# Patient Record
Sex: FEMALE | Race: OTHER | HISPANIC OR LATINO | URBAN - METROPOLITAN AREA
[De-identification: names, ages, dates, MRNs, and addresses within clinical notes are randomized per-mention and may not be internally consistent; named-entity substitution may affect disease eponyms.]

---

## 2019-12-07 ENCOUNTER — EMERGENCY (EMERGENCY)
Facility: HOSPITAL | Age: 24
LOS: 1 days | Discharge: HOME | End: 2019-12-07
Attending: EMERGENCY MEDICINE | Admitting: EMERGENCY MEDICINE
Payer: MEDICAID

## 2019-12-07 VITALS
RESPIRATION RATE: 18 BRPM | TEMPERATURE: 99 F | SYSTOLIC BLOOD PRESSURE: 112 MMHG | OXYGEN SATURATION: 99 % | HEART RATE: 66 BPM | DIASTOLIC BLOOD PRESSURE: 71 MMHG

## 2019-12-07 VITALS
HEIGHT: 68 IN | TEMPERATURE: 97 F | HEART RATE: 79 BPM | SYSTOLIC BLOOD PRESSURE: 119 MMHG | WEIGHT: 184.09 LBS | RESPIRATION RATE: 16 BRPM | DIASTOLIC BLOOD PRESSURE: 78 MMHG | OXYGEN SATURATION: 100 %

## 2019-12-07 PROCEDURE — 99284 EMERGENCY DEPT VISIT MOD MDM: CPT

## 2019-12-07 RX ORDER — GABAPENTIN 400 MG/1
1 CAPSULE ORAL
Qty: 90 | Refills: 0
Start: 2019-12-07 | End: 2020-01-05

## 2019-12-07 RX ORDER — GABAPENTIN 400 MG/1
600 CAPSULE ORAL ONCE
Refills: 0 | Status: COMPLETED | OUTPATIENT
Start: 2019-12-07 | End: 2019-12-07

## 2019-12-07 RX ORDER — OXYCODONE AND ACETAMINOPHEN 5; 325 MG/1; MG/1
1 TABLET ORAL ONCE
Refills: 0 | Status: DISCONTINUED | OUTPATIENT
Start: 2019-12-07 | End: 2019-12-07

## 2019-12-07 RX ADMIN — OXYCODONE AND ACETAMINOPHEN 1 TABLET(S): 5; 325 TABLET ORAL at 09:00

## 2019-12-07 RX ADMIN — OXYCODONE AND ACETAMINOPHEN 1 TABLET(S): 5; 325 TABLET ORAL at 09:35

## 2019-12-07 RX ADMIN — GABAPENTIN 600 MILLIGRAM(S): 400 CAPSULE ORAL at 09:34

## 2019-12-07 NOTE — ED PROVIDER NOTE - NSFOLLOWUPINSTRUCTIONS_ED_ALL_ED_FT
take medications as directed     stretch as discussed    follow up with orthopedics    return to ER if symptoms worsen or for any other concern

## 2019-12-07 NOTE — ED ADULT NURSE REASSESSMENT NOTE - NS ED NURSE REASSESS COMMENT FT1
Pt is A&Ox3 c.o right lower back pain that radiates down right leg since last night. Pt states pain is a 9/10 worse when sitting down. Pt pending assessment by MD. Pt noted OOB with steady gait. Warm packs applied to right lower back with minor relief stated by pt. Pt denies CP, SOB, numbness or tingling to bl upper and lower extremities, loss of bowel and urine.

## 2019-12-07 NOTE — ED PROVIDER NOTE - CARE PROVIDER_API CALL
Wayne Hernandez)  Orthopaedic Surgery Surgery  [address] [place] [address]  Phone: [phone]  Fax: [phone]  Follow Up Time: 1-3 Days

## 2019-12-07 NOTE — ED PROVIDER NOTE - CONSTITUTIONAL, MLM
normal... Awake, alert, oriented to person, place, time/situation. Patient appears mild-to-moderately uncomfortable and is complaining of shooting pain down the posterior back of the right leg.

## 2019-12-07 NOTE — ED ADULT TRIAGE NOTE - CHIEF COMPLAINT QUOTE
BIBA with c/o sudden onset right sides hip pain radiating down to RLE worsening since last night. No known injury, no deformity noted.

## 2019-12-07 NOTE — ED PROVIDER NOTE - CLINICAL SUMMARY MEDICAL DECISION MAKING FREE TEXT BOX
The patient's symptoms progressively improved throughout the ED stay.  ED evaluation and management discussed with the patient and family (if available) in detail.  Close PMD and/or specialist follow up encouraged.  Strict ED return instructions discussed in detail for any worsening or new symptoms. The patient was given the opportunity to ask any questions about their discharge diagnosis and discharge instructions.  The patient verbalized understanding of these instructions and need to return to the ED for any worsening of illness or for any concern. The patient understands Emergency Department diagnosis is a preliminary diagnosis often based on limited information and that the patient must adhere to the follow-up plan as discussed. The patient tolerated PO fluids.  At the time of discharge from the Emergency Department, the patient is alert with fluent appropriate speech and ambulatory without difficulty.  A medical screening examination was performed and no emergency medical condition was identified.

## 2019-12-07 NOTE — ED PROVIDER NOTE - NEUROLOGICAL, MLM
Alert and oriented, no focal deficits, no motor or sensory deficits. Normal sensory, motor, and reflex exams. Alert and oriented, no focal deficits, no motor or sensory deficits. Normal sensory, motor, and reflex exams. gait stable

## 2019-12-07 NOTE — ED PROVIDER NOTE - OBJECTIVE STATEMENT
Chief complaint: BIBA with c/o sudden onset right sides hip pain radiating down to RLE worsening since last night. No known injury, no deformity noted.  Triage vital signs: HR: 79BPM, BP: 119/78, Resp: 16, Temp: 97.4F, SpO2: 100%  Present in the room are patient Tabby Rocha, daniel Pollard, and IDr. Tarun.    25 y/o female with PMHx of recent diagnosis of sciatica 1 month ago (had normal spine x-ray at that time) presents to ED c/o right lower back pain since last night. Patient reports she came home from work last night and had right lower back pain radiating down the back of the right leg to the right knee that is worse with laying down and sitting down with associated tingling to the toes. Denies any fever, chills, urinary or bowel incontinence, saddle anesthesia, weakness, or recent injuries or trauma. States symptoms are similar to her usual sciatica pain, but more intense. Patient did not take any pain meds PTA.

## 2019-12-07 NOTE — ED ADULT NURSE NOTE - OBJECTIVE STATEMENT
Pt is a 24y female complaining of right pain radiating from buttock to upper thigh. Pt states that pain started last night. Pt denies heavy lifting and injury.

## 2019-12-13 DIAGNOSIS — M54.41 LUMBAGO WITH SCIATICA, RIGHT SIDE: ICD-10-CM

## 2019-12-13 DIAGNOSIS — M54.5 LOW BACK PAIN: ICD-10-CM

## 2021-08-18 ENCOUNTER — APPOINTMENT (EMERGENCY)
Dept: RADIOLOGY | Facility: HOSPITAL | Age: 26
End: 2021-08-18

## 2021-08-18 ENCOUNTER — HOSPITAL ENCOUNTER (EMERGENCY)
Facility: HOSPITAL | Age: 26
Discharge: HOME | End: 2021-08-18
Attending: EMERGENCY MEDICINE | Admitting: EMERGENCY MEDICINE

## 2021-08-18 VITALS
TEMPERATURE: 98 F | DIASTOLIC BLOOD PRESSURE: 87 MMHG | HEART RATE: 80 BPM | BODY MASS INDEX: 34.07 KG/M2 | OXYGEN SATURATION: 100 % | HEIGHT: 69 IN | SYSTOLIC BLOOD PRESSURE: 149 MMHG | WEIGHT: 230 LBS | RESPIRATION RATE: 18 BRPM

## 2021-08-18 DIAGNOSIS — M25.571 ACUTE RIGHT ANKLE PAIN: Primary | ICD-10-CM

## 2021-08-18 PROCEDURE — 99282 EMERGENCY DEPT VISIT SF MDM: CPT | Performed by: EMERGENCY MEDICINE

## 2021-08-18 PROCEDURE — 99283 EMERGENCY DEPT VISIT LOW MDM: CPT

## 2021-08-18 PROCEDURE — 73610 X-RAY EXAM OF ANKLE: CPT

## 2021-08-18 RX ORDER — ACETAMINOPHEN 325 MG/1
975 TABLET ORAL ONCE
Status: COMPLETED | OUTPATIENT
Start: 2021-08-18 | End: 2021-08-18

## 2021-08-18 RX ADMIN — ACETAMINOPHEN 975 MG: 325 TABLET, FILM COATED ORAL at 10:11

## 2021-08-18 NOTE — ED PROVIDER NOTES
History  Chief Complaint   Patient presents with    Ankle Pain     pt c/o R ankle pain "after missing a step yesterday"     HPI     60-year-old female presenting to the emergency department with right ankle pain after missing a step yesterday  No significant past medical history  Patient reports that she was stepping down from a step and rolled her ankle to the side  Patient had pain, did not feel or hear a pop after the incident  Patient was unable to walk on the leg immediately after  Patient went home, wrapped the ankle, placed ice  Did not take any pain medications  Patient presents this morning due to persistence of pain and swelling  Denies head trauma, LOC  Denies any recent symptoms of systemic illness  None       Past Medical History:   Diagnosis Date    Asthma        History reviewed  No pertinent surgical history  History reviewed  No pertinent family history  I have reviewed and agree with the history as documented  E-Cigarette/Vaping     E-Cigarette/Vaping Substances     Social History     Tobacco Use    Smoking status: Never Smoker   Substance Use Topics    Alcohol use: Yes     Comment: social    Drug use: Not on file       Review of Systems   Constitutional: Negative for fever  Eyes: Negative for visual disturbance  Respiratory: Negative for shortness of breath  Cardiovascular: Negative for chest pain  Gastrointestinal: Negative for abdominal pain, nausea and vomiting  Musculoskeletal: Positive for gait problem  Negative for back pain and neck pain  Right ankle pain   Skin: Negative for color change, pallor, rash and wound  Neurological: Negative for dizziness, weakness and headaches  Physical Exam  Physical Exam  Vitals and nursing note reviewed  Constitutional:       General: She is not in acute distress  Appearance: She is well-developed  She is not diaphoretic  HENT:      Head: Normocephalic and atraumatic        Right Ear: External ear normal       Left Ear: External ear normal       Nose: Nose normal    Eyes:      General:         Right eye: No discharge  Left eye: No discharge  Extraocular Movements: Extraocular movements intact  Conjunctiva/sclera: Conjunctivae normal       Pupils: Pupils are equal, round, and reactive to light  Cardiovascular:      Rate and Rhythm: Normal rate and regular rhythm  Heart sounds: Normal heart sounds  No murmur heard  No friction rub  No gallop  Pulmonary:      Effort: Pulmonary effort is normal  No respiratory distress  Breath sounds: Normal breath sounds  No stridor  No wheezing, rhonchi or rales  Chest:      Chest wall: No tenderness  Abdominal:      General: Bowel sounds are normal  There is no distension  Palpations: Abdomen is soft  There is no mass  Tenderness: There is no abdominal tenderness  There is no guarding or rebound  Hernia: No hernia is present  Musculoskeletal:         General: Swelling, tenderness and signs of injury present  No deformity  Cervical back: Normal range of motion and neck supple  Right lower leg: No edema  Left lower leg: No edema  Comments: Limited range of motion in the right ankle due to pain, swelling along the lateral malleolus noted  No obvious deformity noted  No pain in foot  Pulses intact  Skin:     General: Skin is warm and dry  Capillary Refill: Capillary refill takes less than 2 seconds  Comments: Small abrasion to right elbow   Neurological:      Mental Status: She is alert and oriented to person, place, and time     Psychiatric:         Mood and Affect: Mood normal          Vital Signs  ED Triage Vitals   Temperature Pulse Respirations Blood Pressure SpO2   08/18/21 0920 08/18/21 0917 08/18/21 0917 08/18/21 0917 08/18/21 0917   98 °F (36 7 °C) 80 18 149/87 100 %      Temp src Heart Rate Source Patient Position - Orthostatic VS BP Location FiO2 (%)   -- -- -- -- -- Pain Score       --                  Vitals:    08/18/21 0917   BP: 149/87   Pulse: 80         Visual Acuity      ED Medications  Medications   acetaminophen (TYLENOL) tablet 975 mg (has no administration in time range)       Diagnostic Studies  Results Reviewed     None                 XR ankle 3+ views RIGHT    (Results Pending)              Procedures  Procedures         ED Course  ED Course as of Aug 20 1602   Wed Aug 18, 2021   6417 32year old female yo presenting to the ED with right ankle pain after missing a step  Vital signs upon arrival within normal limits  Physical exam is remarkable for limited range of motion in her right ankle due to pain with mild swelling along the lateral malleolus with tenderness  1009 Differential diagnosis includes sprain, dislocation, fracture, tendon injury, hematoma  Imaging was ordered  Acetaminophen was ordered for pain  Ice placed to affected area  1024 No acute findings noted  XR ankle 3+ views RIGHT   1027 Patient was informed of imaging findings and need to follow-up with orthopedics if conservative management with ankle brace and crutches, elevation, rest, ice, anti-inflammatories was not sufficient  Patient verbalized understanding and will follow-up as an outpatient  The patient remained clinically stable in the emergency department for 1 hour and 24 minutes without evidence of impending cardiopulmonary instability  1028 Patient was instructed on how to use crutches and was provided with an ankle brace  Upon discharge, patient was fully alert, oriented, GCS 15, in which way with crutches and without any further complaints  MDM    Disposition  Final diagnoses:   None     ED Disposition     None      Follow-up Information    None         Patient's Medications    No medications on file     No discharge procedures on file      PDMP Review     None          ED Provider  Electronically Signed by           Augustina Parnell MD  08/20/21 5705

## 2021-08-18 NOTE — ED NOTES
PT awake and alert, no distress noted  No other questions upon d/c       April Anuja Stewart RN  08/18/21 6650

## 2021-08-18 NOTE — DISCHARGE INSTRUCTIONS
Please make sure to follow-up with orthopedics this week if your symptoms do not improve  You can put weight on the foot as tolerated  When resting, please make sure to ice, elevate the leg  You can take acetaminophen for the pain  If you develop any worsening symptoms including intractable pain, worsening swelling, redness, fever, or any other concerning symptoms, please return to the emergency department as these could be signs of worsening illness

## 2021-08-18 NOTE — Clinical Note
Elinor Whitmore was seen and treated in our emergency department on 8/18/2021  No work until cleared by Family Doctor/Orthopedics        Diagnosis:     Ariel Cox  may return to work on return date  She may return on this date: 08/25/2021         If you have any questions or concerns, please don't hesitate to call        Queen Aries MD    ______________________________           _______________          _______________  Hospital Representative                              Date                                Time

## 2021-08-19 ENCOUNTER — TELEPHONE (OUTPATIENT)
Dept: OBGYN CLINIC | Facility: OTHER | Age: 26
End: 2021-08-19

## 2022-03-01 NOTE — ED PROVIDER NOTE - PATIENT PORTAL LINK FT
Not applicable
You can access the FollowMyHealth Patient Portal offered by [facility] by registering at the following website: [email or web address]. By joining [facility]’s FollowMyHealth portal, you will also be able to view your health information using other applications (apps) compatible with our system.